# Patient Record
Sex: FEMALE | Race: WHITE
[De-identification: names, ages, dates, MRNs, and addresses within clinical notes are randomized per-mention and may not be internally consistent; named-entity substitution may affect disease eponyms.]

---

## 2017-04-17 ENCOUNTER — HOSPITAL ENCOUNTER (OUTPATIENT)
Dept: HOSPITAL 45 - C.MAMM | Age: 52
Discharge: HOME | End: 2017-04-17
Attending: SURGERY
Payer: OTHER GOVERNMENT

## 2017-04-17 DIAGNOSIS — Z08: Primary | ICD-10-CM

## 2017-04-17 DIAGNOSIS — Z85.3: ICD-10-CM

## 2017-04-17 NOTE — MAMMOGRAPHY REPORT
BILATERAL DIGITAL DIAGNOSTIC MAMMOGRAM TOMOSYNTHESIS WITH CAD AND TARGETED RIGHT ULTRASOUND: 4/17/20
17

CLINICAL HISTORY: 52-year-old woman with a personal history of right breast cancer diagnosed in July 2015, status post lumpectomy and radiation.  She presents for reassessment of the right breast and 
annual screening of the left breast.  





TECHNIQUE: Bilateral breast tomosynthesis in addition to standard 2D mammography was performed. Curr
ent study was also evaluated with a Computer Aided Detection (CAD) system.  



COMPARISON: Comparison is made to exams dated:  10/12/2016 mammogram, 4/6/2016 mammogram, 7/31/2015 
mammogram, 7/31/2015 ultrasound biopsy, 7/8/2015 ultrasound, and 5/8/2015 mammogram - [facility].   



BREAST COMPOSITION:  The tissue of both breasts is heterogeneously dense, which may obscure small ma
sses.  



FINDINGS: A linear scar marker overlies the upper outer quadrant of the right breast, denoting the a
robert of prior lumpectomy.  Surgical clips remain in place within the breast.  There is an increasingl
y conspicuous 6.3 x 6.6 mm partially circumscribed mass in the medial anterior right breast, best se
en on the CC view but thought to be located along the posterior nipple line on the MLO view.  When c
omparing to prior available mammograms, this appears somewhat similar to the 2013 and 2008 exams, lee
ggesting benignity.  However, further evaluation with ultrasound was performed in the right breast. 
 No other new suspicious mass, unexpected architectural distortion or cluster of suspicious microcal
cifications is seen bilaterally.  



Real-time high-resolution sonographic evaluation was performed throughout the right breast.  In the 
1:00 periareolar axis, there is a lobulated circumscribed isoechoic to slightly hypoechoic solid mas
s that measures 5.9 x 2.8 x 6.9 mm.  This correlates with the mammographic mass and probably represe
nts a benign fibroadenoma, particularly given the mammographic stability.  However, given that it is
 newly visualized comparing to prior available ultrasound records, a short interval follow-up diagno
stic right mammogram and repeat targeted ultrasound in the 1:00 periareolar breast is recommended to
 ensure stability in 6 months.



IMPRESSION:  ACR-BI-RADS CATEGORY 3: PROBABLY BENIGN, TARGETED ULTRASOUND ACR-BI-RADS CATEGORY 3: NC
OBABLY BENIGN 

1.  A partially circumscribed 6.6 mm mass in the medial anterior right breast, which correlates with
 a benign-appearing solid mass in the 1:00 periareolar right breast on ultrasound is probably benign
.  This most likely represents a fibroadenoma given probable mammographic stability from the 2013 an
d 2008 exams.  However, given that I do not have a prior available ultrasound showing this mass to p
rove stability, a short interval follow-up diagnostic right mammogram and repeat targeted ultrasound
 is recommended in 6 months.

2.  Stable mammographic appearance of the left breast, without mammographic evidence of malignancy. 
 Advise follow-up in 1 year.



These results and recommendations were discussed with the patient at the time of the exam.  She tent
atively scheduled a follow-up appointment prior to leaving our department.





Approximately 10% of breast cancers are not detected with mammography. A negative mammographic repor
t should not delay biopsy if a clinically suggestive mass is present.



Jaja Yeboah M.D.          

ay/:4/17/2017 12:22:54  



Imaging Technologist: Sandie REBOLLEDO)(SALINAS), [facility]

letter sent: Follow Up Recommended 3  

BI-RADS Code: ACR-BI-RADS Category 3: Probably Benign  Ultrasound BI-RADS: ACR-BI-RADS Category 3: P
robably Benign

## 2017-10-18 ENCOUNTER — HOSPITAL ENCOUNTER (OUTPATIENT)
Dept: HOSPITAL 45 - C.MAMM | Age: 52
Discharge: HOME | End: 2017-10-18
Attending: SURGERY
Payer: OTHER GOVERNMENT

## 2017-10-18 DIAGNOSIS — N63.10: Primary | ICD-10-CM

## 2017-10-19 NOTE — MAMMOGRAPHY REPORT
UNILATERAL RIGHT DIGITAL DIAGNOSTIC MAMMOGRAM TOMOSYNTHESIS WITH CAD AND TARGETED RIGHT ULTRASOUND: 1
0/18/2017

CLINICAL HISTORY: History of right breast cancer status post lumpectomy and radiation therapy.  The diana azul presents for short interval follow-up of a right 1:00 breast mass.  She denies any current com
plaints.  





TECHNIQUE:  Breast tomosynthesis in addition to standard 2D mammography was performed. Current study 
was also evaluated with a Computer Aided Detection (CAD) system.  Right CC and MLO 2-D and tomosynthe
sis images were obtained.



COMPARISON: Comparison is made to exams dated:  4/17/2017 ultrasound, 4/17/2017 mammogram, 10/12/2016
 mammogram, 4/6/2016 mammogram, 7/31/2015 mammogram, and 7/31/2015 ultrasound [facility].   



BREAST COMPOSITION:  The tissue of the right breast is heterogeneously dense, which may obscure small
 masses.  



FINDINGS:  There are stable post surgical changes in the right lateral breast at approximately 9:00 f
rom prior lumpectomy, including stable architectural distortion and surgical clips at the lumpectomy 
bed.  Again noted in the right medial breast on the cc tomosynthesis images is an oval circumscribed 
8 mm low-density mass, which appears stable mammographically compared to the April 2017 exam, as well
 as older prior exams including the 2013 exam.  Given the long-term stability, the mass is considered
 benign.  The remainder of the right breast is stable mammographically compared to prior exams, witho
ut suspicious masses, calcifications, or areas of architectural distortion noted.



Targeted ultrasound was performed of the right 1:00 breast in the region of the mammographic mass.  I
n the right 1:00 periareolar breast, again noted is an oval isoechoic circumscribed mass that measure
s 7 x 3 x 6 mm, stable in size and appearance compared to the April 2017 exam where the mass measured
 7 x 6 x 3 mm.  This is felt to correspond with the mammographic mass which is stable dating back to 
at least the 2013 exam.  Given the benign morphology and given the long-term mammographic stability, 
the mass is considered benign.

 



IMPRESSION:  ACR BI-RADS CATEGORY 2: BENIGN, TARGETED ULTRASOUND ACR BI-RADS CATEGORY 2: BENIGN 

The 7 mm right 1:00 periareolar breast mass is stable on ultrasound compared to the April 2017 exam, 
and is stable mammographically dating back to at least the 2013 exam.  Given the benign morphology an
d long-term stability, the mass is considered benign.  There is no mammographic or targeted sonograph
ic evidence of malignancy.  Recommend routine bilateral tomosynthesis mammograms in one year; I would
 recommend the patient remain a diagnostic patient next year so that ultrasound can be performed if n
eeded.  



The patient has been verbally notified of the results.  





Approximately 10% of breast cancers are not detected with mammography. A negative mammographic report
 should not delay biopsy if a clinically suggestive mass is present.



Sarika Ramirez M.D.          

ah/:10/18/2017 08:34:18  



Imaging Technologist: Lashonda REBOLLEDO)(SALINAS), [facility]

letter sent: Normal 1/2  

BI-RADS Code: ACR BI-RADS Category 2: Benign  Ultrasound BI-RADS: ACR BI-RADS Category 2: Benign

## 2018-01-15 ENCOUNTER — HOSPITAL ENCOUNTER (OUTPATIENT)
Dept: HOSPITAL 45 - C.PAPS | Age: 53
Discharge: HOME | End: 2018-01-15
Attending: PHYSICIAN ASSISTANT
Payer: OTHER GOVERNMENT

## 2018-01-15 DIAGNOSIS — Z12.4: Primary | ICD-10-CM

## 2018-01-26 ENCOUNTER — HOSPITAL ENCOUNTER (OUTPATIENT)
Dept: HOSPITAL 45 - C.LAB1850 | Age: 53
Discharge: HOME | End: 2018-01-26
Attending: PHYSICIAN ASSISTANT
Payer: OTHER GOVERNMENT

## 2018-01-26 DIAGNOSIS — Z00.00: Primary | ICD-10-CM

## 2018-01-26 LAB
ALBUMIN SERPL-MCNC: 3.8 GM/DL (ref 3.4–5)
ALP SERPL-CCNC: 42 U/L (ref 45–117)
ALT SERPL-CCNC: 44 U/L (ref 12–78)
AST SERPL-CCNC: 33 U/L (ref 15–37)
BASOPHILS # BLD: 0.03 K/UL (ref 0–0.2)
BASOPHILS NFR BLD: 0.7 %
BUN SERPL-MCNC: 13 MG/DL (ref 7–18)
CALCIUM SERPL-MCNC: 8.7 MG/DL (ref 8.5–10.1)
CO2 SERPL-SCNC: 26 MMOL/L (ref 21–32)
CREAT SERPL-MCNC: 0.76 MG/DL (ref 0.6–1.2)
EOS ABS #: 0.07 K/UL (ref 0–0.5)
EOSINOPHIL NFR BLD AUTO: 169 K/UL (ref 130–400)
GLUCOSE SERPL-MCNC: 86 MG/DL (ref 70–99)
HCT VFR BLD CALC: 40.1 % (ref 37–47)
HGB BLD-MCNC: 14 G/DL (ref 12–16)
IG#: 0 K/UL (ref 0–0.02)
IMM GRANULOCYTES NFR BLD AUTO: 34.5 %
KETONES UR QL STRIP: 186 MG/DL
LYMPHOCYTES # BLD: 1.5 K/UL (ref 1.2–3.4)
MCH RBC QN AUTO: 32.8 PG (ref 25–34)
MCHC RBC AUTO-ENTMCNC: 34.9 G/DL (ref 32–36)
MCV RBC AUTO: 93.9 FL (ref 80–100)
MONO ABS #: 0.48 K/UL (ref 0.11–0.59)
MONOCYTES NFR BLD: 11 %
NEUT ABS #: 2.27 K/UL (ref 1.4–6.5)
NEUTROPHILS # BLD AUTO: 1.6 %
NEUTROPHILS NFR BLD AUTO: 52.2 %
PH UR: 324 MG/DL (ref 0–200)
PMV BLD AUTO: 11.3 FL (ref 7.4–10.4)
POTASSIUM SERPL-SCNC: 3.9 MMOL/L (ref 3.5–5.1)
PROT SERPL-MCNC: 7 GM/DL (ref 6.4–8.2)
RED CELL DISTRIBUTION WIDTH CV: 13 % (ref 11.5–14.5)
RED CELL DISTRIBUTION WIDTH SD: 44.6 FL (ref 36.4–46.3)
SODIUM SERPL-SCNC: 133 MMOL/L (ref 136–145)
WBC # BLD AUTO: 4.35 K/UL (ref 4.8–10.8)

## 2018-04-18 ENCOUNTER — HOSPITAL ENCOUNTER (OUTPATIENT)
Dept: HOSPITAL 45 - C.LAB1850 | Age: 53
Discharge: HOME | End: 2018-04-18
Attending: PHYSICIAN ASSISTANT
Payer: OTHER GOVERNMENT

## 2018-04-18 ENCOUNTER — HOSPITAL ENCOUNTER (OUTPATIENT)
Dept: HOSPITAL 45 - C.MAMM | Age: 53
Discharge: HOME | End: 2018-04-18
Attending: OBSTETRICS & GYNECOLOGY
Payer: OTHER GOVERNMENT

## 2018-04-18 DIAGNOSIS — Z00.00: Primary | ICD-10-CM

## 2018-04-18 DIAGNOSIS — Z12.31: Primary | ICD-10-CM

## 2018-04-18 DIAGNOSIS — Z85.3: ICD-10-CM

## 2018-04-18 LAB — TRANSFERRIN SERPL-MCNC: 235 MG/DL (ref 200–360)

## 2018-04-18 NOTE — MAMMOGRAPHY REPORT
BILATERAL DIGITAL DIAGNOSTIC MAMMOGRAM TOMOSYNTHESIS WITH CAD: 4/18/2018

CLINICAL HISTORY: History of right breast cancer status post lumpectomy and radiation therapy.  The p
atient reports no new lumps or other complaints.  





TECHNIQUE:  Breast tomosynthesis in addition to standard 2D mammography was performed. Current study 
was also evaluated with a Computer Aided Detection (CAD) system.  Bilateral CC and MLO 2D and tomosyn
thesis images were obtained.



COMPARISON: Comparison is made to exams dated:  10/18/2017 ultrasound, 10/18/2017 mammogram, 4/17/201
7 ultrasound, 4/17/2017 mammogram, 10/12/2016 mammogram, and 4/6/2016 mammogram - [facility].   



BREAST COMPOSITION:  The tissue of both breasts is heterogeneously dense, which may obscure small mas
ses.  



FINDINGS: There are stable postsurgical changes in the right lateral breast at approximately 9 to 10:
00 from prior lumpectomy, including stable architectural distortion and surgical clips at the lumpect
leopoldo bed.  There are no suspicious masses, calcifications, or areas of architectural distortion noted 
in either breast.  There has been no significant interval change compared to prior exams.  Nodular 7 
mm asymmetry seen within the right medial breast on the cc view is stable compared to prior exams inc
luding the 2013 exam.





IMPRESSION:  ACR BI-RADS CATEGORY 2: BENIGN

There is no mammographic evidence of malignancy in either breast.   Recommend routine bilateral mammo
grams in 1 year; the patient would like to remain a diagnostic patient given the personal history of 
breast cancer. 



The patient has been verbally notified of the results.  





Approximately 10% of breast cancers are not detected with mammography. A negative mammographic report
 should not delay biopsy if a clinically suggestive mass is present.



Sarika Ramirez M.D.          

/:4/18/2018 08:36:38  



Imaging Technologist: Nadia KISER(DAKOTA)(M), [facility]

letter sent: Normal 1/2  

BI-RADS Code: ACR BI-RADS Category 2: Benign

## 2018-05-18 ENCOUNTER — HOSPITAL ENCOUNTER (OUTPATIENT)
Dept: HOSPITAL 45 - C.LAB1850 | Age: 53
Discharge: HOME | End: 2018-05-18
Attending: INTERNAL MEDICINE
Payer: OTHER GOVERNMENT

## 2018-05-18 DIAGNOSIS — C50.411: Primary | ICD-10-CM

## 2018-05-18 LAB
ALBUMIN SERPL-MCNC: 3.5 GM/DL (ref 3.4–5)
ALP SERPL-CCNC: 51 U/L (ref 45–117)
ALT SERPL-CCNC: 31 U/L (ref 12–78)
AST SERPL-CCNC: 23 U/L (ref 15–37)
BASOPHILS # BLD: 0.03 K/UL (ref 0–0.2)
BASOPHILS NFR BLD: 0.7 %
BUN SERPL-MCNC: 13 MG/DL (ref 7–18)
CALCIUM SERPL-MCNC: 8.4 MG/DL (ref 8.5–10.1)
CO2 SERPL-SCNC: 27 MMOL/L (ref 21–32)
CREAT SERPL-MCNC: 0.7 MG/DL (ref 0.6–1.2)
EOS ABS #: 0.17 K/UL (ref 0–0.5)
EOSINOPHIL NFR BLD AUTO: 181 K/UL (ref 130–400)
FOLLICLE STIMULAT HORMONE: 76.05 IU/L
GLUCOSE SERPL-MCNC: 92 MG/DL (ref 70–99)
HCT VFR BLD CALC: 38.5 % (ref 37–47)
HGB BLD-MCNC: 13.5 G/DL (ref 12–16)
IG#: 0.01 K/UL (ref 0–0.02)
IMM GRANULOCYTES NFR BLD AUTO: 36.9 %
LYMPHOCYTES # BLD: 1.58 K/UL (ref 1.2–3.4)
MCH RBC QN AUTO: 33.1 PG (ref 25–34)
MCHC RBC AUTO-ENTMCNC: 35.1 G/DL (ref 32–36)
MCV RBC AUTO: 94.4 FL (ref 80–100)
MONO ABS #: 0.34 K/UL (ref 0.11–0.59)
MONOCYTES NFR BLD: 7.9 %
NEUT ABS #: 2.15 K/UL (ref 1.4–6.5)
NEUTROPHILS # BLD AUTO: 4 %
NEUTROPHILS NFR BLD AUTO: 50.3 %
PMV BLD AUTO: 11.2 FL (ref 7.4–10.4)
POTASSIUM SERPL-SCNC: 3.8 MMOL/L (ref 3.5–5.1)
PROT SERPL-MCNC: 6.5 GM/DL (ref 6.4–8.2)
RED CELL DISTRIBUTION WIDTH CV: 12.6 % (ref 11.5–14.5)
RED CELL DISTRIBUTION WIDTH SD: 43.3 FL (ref 36.4–46.3)
SODIUM SERPL-SCNC: 135 MMOL/L (ref 136–145)
WBC # BLD AUTO: 4.28 K/UL (ref 4.8–10.8)